# Patient Record
Sex: MALE | Race: WHITE
[De-identification: names, ages, dates, MRNs, and addresses within clinical notes are randomized per-mention and may not be internally consistent; named-entity substitution may affect disease eponyms.]

---

## 2024-11-21 ENCOUNTER — OFFICE VISIT (OUTPATIENT)
Dept: INTERNAL MEDICINE CLINIC | Facility: CLINIC | Age: 59
End: 2024-11-21
Payer: MEDICARE

## 2024-11-21 VITALS
HEIGHT: 68 IN | OXYGEN SATURATION: 98 % | HEART RATE: 80 BPM | SYSTOLIC BLOOD PRESSURE: 120 MMHG | WEIGHT: 184 LBS | BODY MASS INDEX: 27.89 KG/M2 | TEMPERATURE: 97.9 F | DIASTOLIC BLOOD PRESSURE: 78 MMHG

## 2024-11-21 DIAGNOSIS — I10 PRIMARY HYPERTENSION: ICD-10-CM

## 2024-11-21 DIAGNOSIS — E78.5 HYPERLIPIDEMIA, UNSPECIFIED HYPERLIPIDEMIA TYPE: ICD-10-CM

## 2024-11-21 DIAGNOSIS — B35.9 DERMATOPHYTOSIS: ICD-10-CM

## 2024-11-21 DIAGNOSIS — R39.9 LOWER URINARY TRACT SYMPTOMS (LUTS): ICD-10-CM

## 2024-11-21 DIAGNOSIS — Z12.5 SCREENING FOR PROSTATE CANCER: ICD-10-CM

## 2024-11-21 DIAGNOSIS — Z12.11 SCREENING FOR COLON CANCER: ICD-10-CM

## 2024-11-21 DIAGNOSIS — E11.9 TYPE 2 DIABETES MELLITUS WITHOUT COMPLICATION, WITHOUT LONG-TERM CURRENT USE OF INSULIN (HCC): Primary | ICD-10-CM

## 2024-11-21 PROBLEM — M47.812 CERVICAL SPONDYLOSIS WITHOUT MYELOPATHY: Status: ACTIVE | Noted: 2024-08-20

## 2024-11-21 PROBLEM — M54.50 LOW BACK PAIN: Status: ACTIVE | Noted: 2017-04-26

## 2024-11-21 PROBLEM — K21.9 GERD (GASTROESOPHAGEAL REFLUX DISEASE): Status: ACTIVE | Noted: 2017-05-25

## 2024-11-21 PROBLEM — M50.30 DDD (DEGENERATIVE DISC DISEASE), CERVICAL: Status: ACTIVE | Noted: 2024-06-27

## 2024-11-21 PROBLEM — N17.9 ACUTE RENAL FAILURE (ARF) (HCC): Status: ACTIVE | Noted: 2017-05-25

## 2024-11-21 LAB
ALBUMIN SERPL-MCNC: 4.2 G/DL (ref 3.5–5)
ALBUMIN/GLOB SERPL: 1.3 (ref 1–1.9)
ALP SERPL-CCNC: 85 U/L (ref 40–129)
ALT SERPL-CCNC: 25 U/L (ref 8–55)
ANION GAP SERPL CALC-SCNC: 11 MMOL/L (ref 7–16)
AST SERPL-CCNC: 23 U/L (ref 15–37)
BILIRUB SERPL-MCNC: 0.6 MG/DL (ref 0–1.2)
BUN SERPL-MCNC: 22 MG/DL (ref 6–23)
CALCIUM SERPL-MCNC: 9.7 MG/DL (ref 8.8–10.2)
CHLORIDE SERPL-SCNC: 102 MMOL/L (ref 98–107)
CHOLEST SERPL-MCNC: 153 MG/DL (ref 0–200)
CO2 SERPL-SCNC: 25 MMOL/L (ref 20–29)
CREAT SERPL-MCNC: 0.96 MG/DL (ref 0.8–1.3)
CREAT UR-MCNC: 152 MG/DL (ref 39–259)
ERYTHROCYTE [DISTWIDTH] IN BLOOD BY AUTOMATED COUNT: 12.8 % (ref 11.9–14.6)
EST. AVERAGE GLUCOSE BLD GHB EST-MCNC: 255 MG/DL
GLOBULIN SER CALC-MCNC: 3.2 G/DL (ref 2.3–3.5)
GLUCOSE SERPL-MCNC: 237 MG/DL (ref 70–99)
HBA1C MFR BLD: 10.5 % (ref 0–5.6)
HCT VFR BLD AUTO: 41.8 % (ref 41.1–50.3)
HDLC SERPL-MCNC: 50 MG/DL (ref 40–60)
HDLC SERPL: 3.1 (ref 0–5)
HGB BLD-MCNC: 13.9 G/DL (ref 13.6–17.2)
LDLC SERPL CALC-MCNC: 92 MG/DL (ref 0–100)
MCH RBC QN AUTO: 31.2 PG (ref 26.1–32.9)
MCHC RBC AUTO-ENTMCNC: 33.3 G/DL (ref 31.4–35)
MCV RBC AUTO: 93.7 FL (ref 82–102)
MICROALBUMIN UR-MCNC: 18.4 MG/DL (ref 0–20)
MICROALBUMIN/CREAT UR-RTO: 121 MG/G (ref 0–30)
NRBC # BLD: 0 K/UL (ref 0–0.2)
PLATELET # BLD AUTO: 201 K/UL (ref 150–450)
PMV BLD AUTO: 9.8 FL (ref 9.4–12.3)
POTASSIUM SERPL-SCNC: 4.4 MMOL/L (ref 3.5–5.1)
PROT SERPL-MCNC: 7.4 G/DL (ref 6.3–8.2)
PSA SERPL-MCNC: 1 NG/ML (ref 0–4)
RBC # BLD AUTO: 4.46 M/UL (ref 4.23–5.6)
SODIUM SERPL-SCNC: 138 MMOL/L (ref 136–145)
TRIGL SERPL-MCNC: 58 MG/DL (ref 0–150)
TSH W FREE THYROID IF ABNORMAL: 1.86 UIU/ML (ref 0.27–4.2)
VLDLC SERPL CALC-MCNC: 12 MG/DL (ref 6–23)
WBC # BLD AUTO: 4.2 K/UL (ref 4.3–11.1)

## 2024-11-21 PROCEDURE — 3046F HEMOGLOBIN A1C LEVEL >9.0%: CPT | Performed by: INTERNAL MEDICINE

## 2024-11-21 PROCEDURE — 99204 OFFICE O/P NEW MOD 45 MIN: CPT | Performed by: INTERNAL MEDICINE

## 2024-11-21 PROCEDURE — 3074F SYST BP LT 130 MM HG: CPT | Performed by: INTERNAL MEDICINE

## 2024-11-21 PROCEDURE — 3078F DIAST BP <80 MM HG: CPT | Performed by: INTERNAL MEDICINE

## 2024-11-21 RX ORDER — FAMOTIDINE 20 MG/1
20 TABLET, FILM COATED ORAL 2 TIMES DAILY
Qty: 180 TABLET | Refills: 1 | Status: SHIPPED | OUTPATIENT
Start: 2024-11-21

## 2024-11-21 RX ORDER — ROSUVASTATIN CALCIUM 20 MG/1
1 TABLET, COATED ORAL NIGHTLY
COMMUNITY
Start: 2024-11-17 | End: 2024-11-21 | Stop reason: SDUPTHER

## 2024-11-21 RX ORDER — MELOXICAM 15 MG/1
15 TABLET ORAL PRN
COMMUNITY
Start: 2024-06-27

## 2024-11-21 RX ORDER — ROSUVASTATIN CALCIUM 20 MG/1
20 TABLET, COATED ORAL NIGHTLY
Qty: 90 TABLET | Refills: 3 | Status: SHIPPED | OUTPATIENT
Start: 2024-11-21

## 2024-11-21 RX ORDER — TAMSULOSIN HYDROCHLORIDE 0.4 MG/1
0.4 CAPSULE ORAL DAILY
Qty: 90 CAPSULE | Refills: 1 | Status: SHIPPED | OUTPATIENT
Start: 2024-11-21

## 2024-11-21 RX ORDER — LISINOPRIL 10 MG/1
1 TABLET ORAL DAILY
COMMUNITY
End: 2024-11-21 | Stop reason: SDUPTHER

## 2024-11-21 RX ORDER — GLIPIZIDE 5 MG/1
5 TABLET ORAL 2 TIMES DAILY
Qty: 180 TABLET | Refills: 1 | Status: SHIPPED | OUTPATIENT
Start: 2024-11-21

## 2024-11-21 RX ORDER — ASPIRIN 81 MG/1
81 TABLET ORAL DAILY
COMMUNITY

## 2024-11-21 RX ORDER — CLOTRIMAZOLE AND BETAMETHASONE DIPROPIONATE 10; .64 MG/G; MG/G
CREAM TOPICAL
Qty: 45 G | Refills: 0 | Status: SHIPPED | OUTPATIENT
Start: 2024-11-21

## 2024-11-21 RX ORDER — ACETAMINOPHEN 500 MG
500 TABLET ORAL PRN
COMMUNITY

## 2024-11-21 RX ORDER — GLIPIZIDE 5 MG/1
1 TABLET ORAL 2 TIMES DAILY
COMMUNITY
End: 2024-11-21 | Stop reason: SDUPTHER

## 2024-11-21 RX ORDER — LISINOPRIL 10 MG/1
10 TABLET ORAL DAILY
Qty: 90 TABLET | Refills: 3 | Status: SHIPPED | OUTPATIENT
Start: 2024-11-21

## 2024-11-21 SDOH — ECONOMIC STABILITY: INCOME INSECURITY: HOW HARD IS IT FOR YOU TO PAY FOR THE VERY BASICS LIKE FOOD, HOUSING, MEDICAL CARE, AND HEATING?: NOT HARD AT ALL

## 2024-11-21 SDOH — ECONOMIC STABILITY: FOOD INSECURITY: WITHIN THE PAST 12 MONTHS, YOU WORRIED THAT YOUR FOOD WOULD RUN OUT BEFORE YOU GOT MONEY TO BUY MORE.: NEVER TRUE

## 2024-11-21 SDOH — ECONOMIC STABILITY: FOOD INSECURITY: WITHIN THE PAST 12 MONTHS, THE FOOD YOU BOUGHT JUST DIDN'T LAST AND YOU DIDN'T HAVE MONEY TO GET MORE.: NEVER TRUE

## 2024-11-21 ASSESSMENT — PATIENT HEALTH QUESTIONNAIRE - PHQ9
1. LITTLE INTEREST OR PLEASURE IN DOING THINGS: NOT AT ALL
2. FEELING DOWN, DEPRESSED OR HOPELESS: NOT AT ALL
SUM OF ALL RESPONSES TO PHQ QUESTIONS 1-9: 0
SUM OF ALL RESPONSES TO PHQ9 QUESTIONS 1 & 2: 0
SUM OF ALL RESPONSES TO PHQ QUESTIONS 1-9: 0

## 2024-11-21 ASSESSMENT — ENCOUNTER SYMPTOMS
VOMITING: 0
SHORTNESS OF BREATH: 0
NAUSEA: 0
CHEST TIGHTNESS: 0
ABDOMINAL PAIN: 0
DIARRHEA: 0
WHEEZING: 0

## 2024-11-21 NOTE — PROGRESS NOTES
Andres was seen today for new patient and medication refill.    Diagnoses and all orders for this visit:    Type 2 diabetes mellitus without complication, without long-term current use of insulin (HCC)  -     glipiZIDE (GLUCOTROL) 5 MG tablet; Take 1 tablet by mouth 2 times daily  -     lisinopril (PRINIVIL;ZESTRIL) 10 MG tablet; Take 1 tablet by mouth daily  -     Discontinue: omeprazole (PRILOSEC) 20 MG delayed release capsule; Take 1 capsule by mouth daily as needed (reflux)  -     rosuvastatin (CRESTOR) 20 MG tablet; Take 1 tablet by mouth nightly  -     Hemoglobin A1C; Future  -     Lipid Panel; Future  -     Microalbumin / Creatinine Urine Ratio; Future  -     Comprehensive Metabolic Panel; Future  -     CBC; Future  -     TSH with Reflex; Future  -     TSH with Reflex  -     CBC  -     Comprehensive Metabolic Panel  -     Microalbumin / Creatinine Urine Ratio  -     Lipid Panel  -     Hemoglobin A1C  -     Hemoglobin A1C; Future  -     CBC; Future  -     Comprehensive Metabolic Panel; Future  -     Lipid Panel; Future  -     Cologuard (Fecal DNA Colorectal Cancer Screening)  -     dulaglutide (TRULICITY) 0.75 MG/0.5ML SOAJ SC injection; Inject 0.5 mLs into the skin once a week    Screening for prostate cancer  -     PSA Screening; Future  -     PSA Screening    Hyperlipidemia, unspecified hyperlipidemia type  -     glipiZIDE (GLUCOTROL) 5 MG tablet; Take 1 tablet by mouth 2 times daily  -     lisinopril (PRINIVIL;ZESTRIL) 10 MG tablet; Take 1 tablet by mouth daily  -     Discontinue: omeprazole (PRILOSEC) 20 MG delayed release capsule; Take 1 capsule by mouth daily as needed (reflux)  -     rosuvastatin (CRESTOR) 20 MG tablet; Take 1 tablet by mouth nightly  -     Hemoglobin A1C; Future  -     Lipid Panel; Future  -     Microalbumin / Creatinine Urine Ratio; Future  -     Comprehensive Metabolic Panel; Future  -     CBC; Future  -     TSH with Reflex; Future  -     TSH with Reflex  -     CBC  -

## 2024-11-22 RX ORDER — DULAGLUTIDE 0.75 MG/.5ML
0.75 INJECTION, SOLUTION SUBCUTANEOUS WEEKLY
Qty: 4 ADJUSTABLE DOSE PRE-FILLED PEN SYRINGE | Refills: 5 | Status: SHIPPED | OUTPATIENT
Start: 2024-11-22

## 2024-12-19 LAB — NONINV COLON CA DNA+OCC BLD SCRN STL QL: NEGATIVE

## 2024-12-30 ENCOUNTER — OFFICE VISIT (OUTPATIENT)
Dept: UROLOGY | Age: 59
End: 2024-12-30
Payer: MEDICARE

## 2024-12-30 VITALS — WEIGHT: 188.6 LBS | BODY MASS INDEX: 28.58 KG/M2 | HEIGHT: 68 IN

## 2024-12-30 DIAGNOSIS — N48.0 BXO (BALANITIS XEROTICA OBLITERANS): ICD-10-CM

## 2024-12-30 DIAGNOSIS — R82.81 PYURIA: ICD-10-CM

## 2024-12-30 DIAGNOSIS — R39.9 LOWER URINARY TRACT SYMPTOMS (LUTS): Primary | ICD-10-CM

## 2024-12-30 DIAGNOSIS — N47.5 PREPUTIAL ADHESIONS: ICD-10-CM

## 2024-12-30 DIAGNOSIS — N35.014 POST-TRAUMATIC MALE URETHRAL STRICTURE: ICD-10-CM

## 2024-12-30 LAB
BILIRUBIN, URINE, POC: NEGATIVE
BLOOD URINE, POC: NEGATIVE
GLUCOSE URINE, POC: NEGATIVE MG/DL
KETONES, URINE, POC: ABNORMAL MG/DL
LEUKOCYTE ESTERASE, URINE, POC: ABNORMAL
NITRITE, URINE, POC: POSITIVE
PH, URINE, POC: 6.5 (ref 4.6–8)
PROTEIN,URINE, POC: 30 MG/DL
SPECIFIC GRAVITY, URINE, POC: 1.02 (ref 1–1.03)
URINALYSIS CLARITY, POC: CLEAR
URINALYSIS COLOR, POC: YELLOW
UROBILINOGEN, POC: ABNORMAL MG/DL

## 2024-12-30 PROCEDURE — 99204 OFFICE O/P NEW MOD 45 MIN: CPT | Performed by: UROLOGY

## 2024-12-30 PROCEDURE — 81003 URINALYSIS AUTO W/O SCOPE: CPT | Performed by: UROLOGY

## 2024-12-30 RX ORDER — SULFAMETHOXAZOLE AND TRIMETHOPRIM 800; 160 MG/1; MG/1
1 TABLET ORAL 2 TIMES DAILY
Qty: 14 TABLET | Refills: 0 | Status: SHIPPED | OUTPATIENT
Start: 2024-12-30 | End: 2025-01-06

## 2024-12-30 NOTE — PROGRESS NOTES
[facility]  [address]  [address]  [place] [address]  [phone]    Andres Ramos  : 1965    Chief Complaint   Patient presents with    New Patient     Lower Urinary Tract Symptoms        HPI     Andres Ramos is a 59 y.o. male  referred by Dr. Howard Rayo for evaluation and treatment of urethral stricture. States that he had urethral dilation in the past in [place], [place].  He also c/o balanitis. He does have weak stream. He has trouble maintaining erections. Has pain in penis with intercourse.   Progress Notes  - documented in this encounter   Jose Willis MD - 10/29/2019 9:45 AM EDT  Formatting of this note is different from the original.  ASSESSMENT:  Diagnosis ICD-10-CM Associated Orders  1. Post-traumatic bulbous urethral stricture N35.011    PLAN:  [facility] referral    HISTORY OF PRESENT ILLNESS:  Discussed with patient urethral strictures once again. Lives in [place], [place]    Once again suggested that he get set up with [facility] care at [facility] since he lives in [place]. He may need a more definitive open urethral reconstructive repair if he has rapid recurrence.  He thinks his Ins may Cover [facility], so I am going to refer him to a reconstructive urologist at [facility]  He seems to understand well will follow-up with us in 3 months to check his flow pattern.  MEDICATIONS:  Current Outpatient Medications  Medication Sig Dispense Refill   amitriptyline (ELAVIL) 25 MG tablet   aspirin (ECOTRIN) 81 MG tablet Take 81 mg by mouth daily.   HYDROcodone-acetaminophen (NORCO) 7.5-325 mg per tablet Take 1 tablet by mouth every six (6) hours as needed for pain.   lisinopril-hydrochlorothiazide (PRINZIDE,ZESTORETIC) 20-12.5 mg per tablet Take 1 tablet by mouth nightly.   metFORMIN (GLUCOPHAGE) 1000 MG tablet Take 1,000 mg by mouth 2 (two) times a day with meals.   omeprazole (PRILOSEC) 20 MG capsule Take 20 mg by mouth daily as needed.   rosuvastatin (CRESTOR) 20

## 2024-12-31 DIAGNOSIS — R82.81 PYURIA: ICD-10-CM

## 2025-01-03 LAB
BACTERIA SPEC CULT: ABNORMAL
SERVICE CMNT-IMP: ABNORMAL

## 2025-01-15 ENCOUNTER — TELEPHONE (OUTPATIENT)
Dept: UROLOGY | Age: 60
End: 2025-01-15

## 2025-01-15 DIAGNOSIS — N30.00 ACUTE CYSTITIS WITHOUT HEMATURIA: Primary | ICD-10-CM

## 2025-01-15 DIAGNOSIS — N39.0 URINARY TRACT INFECTION WITHOUT HEMATURIA, SITE UNSPECIFIED: ICD-10-CM

## 2025-01-15 NOTE — TELEPHONE ENCOUNTER
Patient called, he finished antibx from last visit, still have urinary symptoms (burning and frequency).  Wants to know if he needs additional antibiotics.      Also your last note said possibly follow up with [facility] and cysto, is that what you would like to do next?

## 2025-01-23 ENCOUNTER — TELEPHONE (OUTPATIENT)
Dept: INTERNAL MEDICINE CLINIC | Facility: CLINIC | Age: 60
End: 2025-01-23

## 2025-01-23 NOTE — TELEPHONE ENCOUNTER
Called and talked with the patient fasting  lab 2/24/25 at 9:30 and OV 2/28/25 at 11:15. Because of abnormal labs the Dr wanted to recheck in 3 months.

## 2025-01-23 NOTE — TELEPHONE ENCOUNTER
Patient called and states that he has been trying to get in touch with Dr. Salguero's office in regards to getting a antibiotic for a bacterial infection that he has. Patient would like to know if Dr. Rayo can send something in for him. Patient would also like to know which appointment he needs to keep. He is scheduled for a lab on 2/24/25 and a follow up on 2/28/25 and then he also has another lab scheduled for 5/21/25 and follow up on 5/28/25 with Dr. Rayo. The last office visit note form 11/21/24 states to return in one month around 12/21/24. Patient states that he is needing clarification on which appointments he is needing to keep. Please Advise.

## 2025-01-27 ENCOUNTER — TELEPHONE (OUTPATIENT)
Dept: UROLOGY | Age: 60
End: 2025-01-27

## 2025-01-27 RX ORDER — SULFAMETHOXAZOLE AND TRIMETHOPRIM 800; 160 MG/1; MG/1
1 TABLET ORAL 2 TIMES DAILY
Qty: 14 TABLET | Refills: 0 | Status: SHIPPED | OUTPATIENT
Start: 2025-01-27 | End: 2025-01-27 | Stop reason: SDUPTHER

## 2025-01-27 RX ORDER — SULFAMETHOXAZOLE AND TRIMETHOPRIM 800; 160 MG/1; MG/1
1 TABLET ORAL 2 TIMES DAILY
Qty: 14 TABLET | Refills: 0 | Status: SHIPPED | OUTPATIENT
Start: 2025-01-27 | End: 2025-02-03

## 2025-01-27 NOTE — TELEPHONE ENCOUNTER
Diagnosis Orders   1. Acute cystitis without hematuria  US RETROPERITONEAL COMPLETE    sulfamethoxazole-trimethoprim (BACTRIM DS) 800-160 MG per tablet      2. Urinary tract infection without hematuria, site unspecified  US RETROPERITONEAL COMPLETE        Set up for cysto

## 2025-01-27 NOTE — TELEPHONE ENCOUNTER
Pt called in requesting to have antibiotics (Bactrim DS) sent to the pharmacy. Pt having UTI symptoms. Rhonda young requesting and will have Jose M send for pt.

## 2025-02-25 ENCOUNTER — TELEPHONE (OUTPATIENT)
Dept: INTERNAL MEDICINE CLINIC | Facility: CLINIC | Age: 60
End: 2025-02-25

## 2025-02-25 RX ORDER — OMEPRAZOLE 20 MG/1
20 CAPSULE, DELAYED RELEASE ORAL
Qty: 90 CAPSULE | Refills: 1 | Status: SHIPPED | OUTPATIENT
Start: 2025-02-25

## 2025-02-25 NOTE — TELEPHONE ENCOUNTER
Rx was D/C'ed off med list uses prn. We started seeing as NP 11/24 can we send in refill ? Told patient I would check with Dr Rayo if there was a problem I would call him otherwise will send it to [facility].

## 2025-02-25 NOTE — TELEPHONE ENCOUNTER
Needs refills on ----PHARMACY CHANGE    omeprazole (PRILOSEC) 20 MG delayed release capsule [[id number]]  DISCONTINUED    Order Details    Dose: 20 mg Route: Oral Frequency: PRN   Dispense Quantity: -- Refills: --          Sig: Take 1 capsule by mouth as needed             [facility] in [place]

## 2025-05-21 DIAGNOSIS — N39.0 URINARY TRACT INFECTION WITHOUT HEMATURIA, SITE UNSPECIFIED: ICD-10-CM

## 2025-05-21 DIAGNOSIS — E11.9 TYPE 2 DIABETES MELLITUS WITHOUT COMPLICATION, WITHOUT LONG-TERM CURRENT USE OF INSULIN (HCC): ICD-10-CM

## 2025-05-21 DIAGNOSIS — R39.9 LOWER URINARY TRACT SYMPTOMS (LUTS): ICD-10-CM

## 2025-05-21 DIAGNOSIS — Z12.11 SCREENING FOR COLON CANCER: ICD-10-CM

## 2025-05-21 DIAGNOSIS — E78.5 HYPERLIPIDEMIA, UNSPECIFIED HYPERLIPIDEMIA TYPE: ICD-10-CM

## 2025-05-21 DIAGNOSIS — I10 PRIMARY HYPERTENSION: ICD-10-CM

## 2025-05-21 DIAGNOSIS — N30.00 ACUTE CYSTITIS WITHOUT HEMATURIA: ICD-10-CM

## 2025-05-21 LAB
ALBUMIN SERPL-MCNC: 4.1 G/DL (ref 3.5–5)
ALBUMIN/GLOB SERPL: 1.3 (ref 1–1.9)
ALP SERPL-CCNC: 85 U/L (ref 40–129)
ALT SERPL-CCNC: 27 U/L (ref 8–55)
ANION GAP SERPL CALC-SCNC: 14 MMOL/L (ref 7–16)
AST SERPL-CCNC: 27 U/L (ref 15–37)
BILIRUB SERPL-MCNC: 0.5 MG/DL (ref 0–1.2)
BUN SERPL-MCNC: 23 MG/DL (ref 6–23)
CALCIUM SERPL-MCNC: 10 MG/DL (ref 8.8–10.2)
CHLORIDE SERPL-SCNC: 101 MMOL/L (ref 98–107)
CHOLEST SERPL-MCNC: 159 MG/DL (ref 0–200)
CO2 SERPL-SCNC: 23 MMOL/L (ref 20–29)
CREAT SERPL-MCNC: 1.08 MG/DL (ref 0.8–1.3)
CREAT UR-MCNC: 180 MG/DL (ref 39–259)
ERYTHROCYTE [DISTWIDTH] IN BLOOD BY AUTOMATED COUNT: 13.4 % (ref 11.9–14.6)
EST. AVERAGE GLUCOSE BLD GHB EST-MCNC: 176 MG/DL
GLOBULIN SER CALC-MCNC: 3.2 G/DL (ref 2.3–3.5)
GLUCOSE SERPL-MCNC: 230 MG/DL (ref 70–99)
HBA1C MFR BLD: 7.8 % (ref 0–5.6)
HCT VFR BLD AUTO: 41.5 % (ref 41.1–50.3)
HDLC SERPL-MCNC: 41 MG/DL (ref 40–60)
HDLC SERPL: 3.9 (ref 0–5)
HGB BLD-MCNC: 14 G/DL (ref 13.6–17.2)
LDLC SERPL CALC-MCNC: 84 MG/DL (ref 0–100)
MCH RBC QN AUTO: 31.6 PG (ref 26.1–32.9)
MCHC RBC AUTO-ENTMCNC: 33.7 G/DL (ref 31.4–35)
MCV RBC AUTO: 93.7 FL (ref 82–102)
MICROALBUMIN UR-MCNC: 37.2 MG/DL (ref 0–20)
MICROALBUMIN/CREAT UR-RTO: 207 MG/G (ref 0–30)
NRBC # BLD: 0 K/UL (ref 0–0.2)
PLATELET # BLD AUTO: 203 K/UL (ref 150–450)
PMV BLD AUTO: 10.2 FL (ref 9.4–12.3)
POTASSIUM SERPL-SCNC: 4.5 MMOL/L (ref 3.5–5.1)
PROT SERPL-MCNC: 7.3 G/DL (ref 6.3–8.2)
RBC # BLD AUTO: 4.43 M/UL (ref 4.23–5.6)
SODIUM SERPL-SCNC: 138 MMOL/L (ref 136–145)
TRIGL SERPL-MCNC: 174 MG/DL (ref 0–150)
VLDLC SERPL CALC-MCNC: 35 MG/DL (ref 6–23)
WBC # BLD AUTO: 7 K/UL (ref 4.3–11.1)

## 2025-05-28 ENCOUNTER — TELEPHONE (OUTPATIENT)
Dept: INTERNAL MEDICINE CLINIC | Facility: CLINIC | Age: 60
End: 2025-05-28

## 2025-06-03 ENCOUNTER — OFFICE VISIT (OUTPATIENT)
Dept: INTERNAL MEDICINE CLINIC | Facility: CLINIC | Age: 60
End: 2025-06-03
Payer: MEDICARE

## 2025-06-03 VITALS
DIASTOLIC BLOOD PRESSURE: 78 MMHG | WEIGHT: 185 LBS | OXYGEN SATURATION: 94 % | HEIGHT: 68 IN | BODY MASS INDEX: 28.04 KG/M2 | HEART RATE: 88 BPM | TEMPERATURE: 98.5 F | SYSTOLIC BLOOD PRESSURE: 120 MMHG

## 2025-06-03 DIAGNOSIS — Z12.5 SCREENING FOR PROSTATE CANCER: Primary | ICD-10-CM

## 2025-06-03 DIAGNOSIS — E78.5 HYPERLIPIDEMIA, UNSPECIFIED HYPERLIPIDEMIA TYPE: ICD-10-CM

## 2025-06-03 DIAGNOSIS — E11.9 TYPE 2 DIABETES MELLITUS WITHOUT COMPLICATION, WITHOUT LONG-TERM CURRENT USE OF INSULIN (HCC): ICD-10-CM

## 2025-06-03 DIAGNOSIS — R39.9 LOWER URINARY TRACT SYMPTOMS (LUTS): ICD-10-CM

## 2025-06-03 DIAGNOSIS — I10 PRIMARY HYPERTENSION: ICD-10-CM

## 2025-06-03 PROCEDURE — 3051F HG A1C>EQUAL 7.0%<8.0%: CPT | Performed by: INTERNAL MEDICINE

## 2025-06-03 PROCEDURE — 3074F SYST BP LT 130 MM HG: CPT | Performed by: INTERNAL MEDICINE

## 2025-06-03 PROCEDURE — 3078F DIAST BP <80 MM HG: CPT | Performed by: INTERNAL MEDICINE

## 2025-06-03 PROCEDURE — 99214 OFFICE O/P EST MOD 30 MIN: CPT | Performed by: INTERNAL MEDICINE

## 2025-06-03 RX ORDER — TAMSULOSIN HYDROCHLORIDE 0.4 MG/1
0.4 CAPSULE ORAL DAILY
Qty: 90 CAPSULE | Refills: 3 | Status: SHIPPED | OUTPATIENT
Start: 2025-06-03

## 2025-06-03 RX ORDER — GLIPIZIDE 5 MG/1
5 TABLET ORAL 2 TIMES DAILY
Qty: 180 TABLET | Refills: 1 | Status: SHIPPED | OUTPATIENT
Start: 2025-06-03

## 2025-06-03 RX ORDER — LISINOPRIL 20 MG/1
20 TABLET ORAL DAILY
Qty: 90 TABLET | Refills: 1 | Status: SHIPPED | OUTPATIENT
Start: 2025-06-03

## 2025-06-03 SDOH — ECONOMIC STABILITY: FOOD INSECURITY: WITHIN THE PAST 12 MONTHS, YOU WORRIED THAT YOUR FOOD WOULD RUN OUT BEFORE YOU GOT MONEY TO BUY MORE.: NEVER TRUE

## 2025-06-03 SDOH — ECONOMIC STABILITY: FOOD INSECURITY: WITHIN THE PAST 12 MONTHS, THE FOOD YOU BOUGHT JUST DIDN'T LAST AND YOU DIDN'T HAVE MONEY TO GET MORE.: NEVER TRUE

## 2025-06-03 ASSESSMENT — PATIENT HEALTH QUESTIONNAIRE - PHQ9
2. FEELING DOWN, DEPRESSED OR HOPELESS: NOT AT ALL
SUM OF ALL RESPONSES TO PHQ QUESTIONS 1-9: 0
1. LITTLE INTEREST OR PLEASURE IN DOING THINGS: NOT AT ALL
SUM OF ALL RESPONSES TO PHQ QUESTIONS 1-9: 0

## 2025-06-03 NOTE — PROGRESS NOTES
Andres Ramos (: 1965) is a 60 y.o. male, here for evaluation of the following chief complaint(s):  Follow-up (6  month check up and review labs DM,HTN,lipids)     Lab Results   Component Value Date    LABA1C 7.8 (H) 2025    LABA1C 10.5 (H) 2024     Lab Results   Component Value Date    CREATININE 1.08 2025       Assessment & Plan  1. Diabetes Mellitus.  - His A1c has improved from 10.5 to 7.8, but it remains above the target range.  - Glucose level was 230 when checked.  - Advised to maintain a consistent diet and exercise regimen to further reduce A1c levels and avoid diet drinks, switching to water.  - Encouraged to continue monitoring blood sugar levels regularly.    2. Hyperlipidemia.  - Cholesterol level is currently at 159, which is within the acceptable range.  - Liver function is good.  - Advised to continue current lipid-lowering therapy and maintain a heart-healthy diet.    3. Hypertension.  - Blood pressure monitoring and maintaining a low-sodium diet recommended.  - Increased dosage of lisinopril to 20 mg to provide additional renal protection and better control blood pressure.  - Discussed the role of lisinopril in protecting kidney function.    4. Early-stage Renal Failure.  - Urine albumin-to-creatinine ratio increased from 121 to 207, indicating early-stage renal failure.  - Condition likely due to meloxicam use, high blood glucose levels, hypertension, and diabetes.  - Advised to avoid nephrotoxic medications and maintain good glycemic and blood pressure control.  - Increased dosage of lisinopril to help reduce pressure on kidneys.    5. Lower Urinary Tract Symptoms.  - Reports frequent urination, possibly exacerbated by consumption of Diet Dr. Pepper.  - Advised to reduce intake of caffeinated beverages.  - Discussed that frequent urination is not necessarily related to kidney function.    6. Health Maintenance.  - Advised to schedule an eye exam with an

## 2025-06-23 ENCOUNTER — OFFICE VISIT (OUTPATIENT)
Dept: INTERNAL MEDICINE CLINIC | Facility: CLINIC | Age: 60
End: 2025-06-23
Payer: MEDICARE

## 2025-06-23 VITALS
HEART RATE: 90 BPM | HEIGHT: 68 IN | WEIGHT: 187 LBS | DIASTOLIC BLOOD PRESSURE: 70 MMHG | SYSTOLIC BLOOD PRESSURE: 118 MMHG | BODY MASS INDEX: 28.34 KG/M2 | TEMPERATURE: 98.8 F | OXYGEN SATURATION: 97 %

## 2025-06-23 DIAGNOSIS — H61.22 CERUMEN DEBRIS ON TYMPANIC MEMBRANE OF LEFT EAR: Primary | ICD-10-CM

## 2025-06-23 PROCEDURE — 99213 OFFICE O/P EST LOW 20 MIN: CPT | Performed by: INTERNAL MEDICINE

## 2025-06-23 PROCEDURE — 3074F SYST BP LT 130 MM HG: CPT | Performed by: INTERNAL MEDICINE

## 2025-06-23 PROCEDURE — 3078F DIAST BP <80 MM HG: CPT | Performed by: INTERNAL MEDICINE

## 2025-06-23 NOTE — PROGRESS NOTES
Andres Ramos (: 1965) is a 60 y.o. male, here for evaluation of the following chief complaint(s):  Other (C/O left ear stopped up)     Assessment & Plan  1. Cerumen impaction in the left ear.  - The cerumen in the left ear is notably hard and dry, causing significant hearing impairment.  - An attempt to remove the cerumen was made during this visit, but it was unsuccessful due to the hardness of the wax.  - He is advised to use Debrox for the next 2 to 3 days to soften the cerumen.  - A follow-up appointment will be scheduled later this week for another attempt at removal. If the cerumen does not come out easily after a few tries, the procedure will be stopped and retried after using Debrox for a longer period.  1. Cerumen debris on tympanic membrane of left ear  -     [facility]  We got some wax out with ikrrigation but large bit wouldn't budge ,get ent.  History of Present Illness  The patient is a 60-year-old male who presents for evaluation of left ear cerumen impaction.    He reports intermittent episodes of left ear blockage, which he attributes to cerumen accumulation. He has not experienced any otorrhea. An audiologist confirmed the presence of cerumen in his left ear. He has attempted self-removal of the cerumen using an ear bulb syringe and over-the-counter ear wax removal drops, but these interventions have been unsuccessful. He was previously advised by a nurse at [facility] to have his ear cleaned, but this procedure was not performed. The impacted cerumen has been causing significant hearing impairment, prompting his visit today. He reports that the cerumen has been present for over a year.    Social History     Tobacco Use    Smoking status: Never     Passive exposure: Never    Smokeless tobacco: Never    Tobacco comments:     Remote dipping   Vaping Use    Vaping status: Never Used   Substance Use Topics    Alcohol use: Not Currently     Comment: 9 months    Drug

## 2025-09-03 ENCOUNTER — TELEPHONE (OUTPATIENT)
Dept: INTERNAL MEDICINE CLINIC | Facility: CLINIC | Age: 60
End: 2025-09-03